# Patient Record
Sex: FEMALE | Race: WHITE
[De-identification: names, ages, dates, MRNs, and addresses within clinical notes are randomized per-mention and may not be internally consistent; named-entity substitution may affect disease eponyms.]

---

## 2018-01-24 ENCOUNTER — HOSPITAL ENCOUNTER (OUTPATIENT)
Dept: HOSPITAL 45 - C.MRIBC | Age: 53
Discharge: HOME | End: 2018-01-24
Attending: ORTHOPAEDIC SURGERY
Payer: COMMERCIAL
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
## 2018-01-24 DIAGNOSIS — S46.912A: Primary | ICD-10-CM

## 2018-01-24 NOTE — DIAGNOSTIC IMAGING REPORT
LEFT SHOULDER MRI



HISTORY:      Left shoulder pain.



TECHNIQUE: Multiplanar multisequence MRI of the left shoulder was performed

without contrast.



COMPARISON STUDY:  None.



FINDINGS: 



AC joint: There is moderate AC joint arthrosis demonstrated by subchondral

edema/cystic change and mild surrounding soft tissue edema.



Rotator cuff: The subscapularis and teres minor tendons are intact. Small

partial bursal surface tear seen within the distal infraspinatus tendon. There

is also a focal full-thickness tear of the supraspinatus tendon which measures

approximate 17 x 13 mm. There is edema at the musculotendinous junction of the

supraspinatus muscle. Trace fluid within the subacromial/subdeltoid bursa. Mild

fatty atrophy of the supraspinatus muscle.



Labrum: Abnormal signal configuration the superior labrum consistent with a SLAP

tear.



Biceps tendon: Increased signal within the proximal long head of the biceps

tendon consistent with a mild tendinopathy.



Bones: No fracture or dislocation.



Cartilage: Mild cartilage thinning within the humeral head and glenoid.



Miscellaneous: Punctate hypointense focus adjacent to the musculotendinous

junction of the supraspinatus which may be due to old postoperative change.



IMPRESSION:  



1. Focal 17 x 13 mm full-thickness tear of the supraspinatus tendon without

significant retraction.

2. There is also small partial tear at the bursal surface of the distal

infraspinatus tendon. 

3. SLAP tear.







Electronically signed by:  Sundeep Bass M.D.

1/24/2018 12:32 PM



Dictated Date/Time:  1/24/2018 12:25 PM

## 2018-03-01 ENCOUNTER — HOSPITAL ENCOUNTER (OUTPATIENT)
Dept: HOSPITAL 45 - X.SURG | Age: 53
Discharge: HOME | End: 2018-03-01
Attending: ORTHOPAEDIC SURGERY
Payer: COMMERCIAL

## 2018-03-01 VITALS — HEART RATE: 78 BPM | DIASTOLIC BLOOD PRESSURE: 65 MMHG | OXYGEN SATURATION: 99 % | SYSTOLIC BLOOD PRESSURE: 98 MMHG

## 2018-03-01 VITALS — TEMPERATURE: 97.88 F

## 2018-03-01 VITALS
WEIGHT: 148.3 LBS | BODY MASS INDEX: 25.32 KG/M2 | WEIGHT: 148.3 LBS | HEIGHT: 64.02 IN | HEIGHT: 64.02 IN | BODY MASS INDEX: 25.32 KG/M2

## 2018-03-01 DIAGNOSIS — Z82.3: ICD-10-CM

## 2018-03-01 DIAGNOSIS — Z90.49: ICD-10-CM

## 2018-03-01 DIAGNOSIS — M75.102: Primary | ICD-10-CM

## 2018-03-01 DIAGNOSIS — Z80.42: ICD-10-CM

## 2018-03-01 DIAGNOSIS — Z90.710: ICD-10-CM

## 2018-03-01 DIAGNOSIS — Z83.3: ICD-10-CM

## 2018-03-01 NOTE — [facility] POST OPERATIVE BRIEF NOTE
Immediate Operative Summary


Operative Date


Mar 1, 2018.





Pre-Operative Diagnosis





Chronic pain right upper limb, full thickness tear





Post-Operative Diagnosis





same as preop





Procedure(s) Performed





Left Shoulder Arthroscopic Medium Rotator cuff Repair with Extensive Debridement





Surgeon


Dr. Subramanian





Assistant Surgeon(s)


SHAHBAZ Ivy





Estimated Blood Loss


5ML





Findings


Consistent with Post-Op Diagnosis





Specimens





none





Anesthesia Type


[facility]





Complication(s)


none





Disposition


Disposition:  Recovery Room / PACU

## 2018-03-01 NOTE — OPERATIVE REPORT
DATE OF OPERATION:  03/01/2018

 

PREOPERATIVE DIAGNOSIS:  Medium sized rotator cuff tear of the left shoulder.

 

POSTOPERATIVE DIAGNOSIS:  Same.

 

PROCEDURE:  Left shoulder diagnostic arthroscopy with extensive debridement

and medium sized rotator cuff repair.

 

SURGEON:  Dr. Andrea Subramanian.

 

ASSISTANT:  Troy Real PA-C, whose assistance was necessary for positioning

the arm and helping with instrumentation.

 

ANESTHESIA:  Sedation with a left interscalene nerve block.

 

COMPLICATIONS:  None.

 

CONDITION:  Stable to PACU.

 

INDICATIONS:  Char is a pleasant 52-year-old female who had a left shoulder

arthroscopy done 18 months ago in [place].  She had a decompression and

distal clavicle resection at that time.  Unfortunately, she never did well

postoperatively and followup MRI in my office did show a medium size rotator

cuff tear.  She elected to undergo arthroscopy.

 

DESCRIPTION OF PROCEDURE:  On 03/01/2018, she arrived at [facility] for the above procedure.  She was seen in preoperative

holding and the operative extremity was identified and signed.  She was given

a preoperative antibiotic and a left interscalene nerve block.  She was taken

back to the operating room, laid on the table in supine position and put

under basic sedation.  She was then put into the beach chair position.  The

left shoulder was prepped and draped in sterile fashion.  Time-out was done. 

The patient's operative extremity was properly identified.

 

A scope was introduced into the posterior portal.  Diagnostic arthroscopy

showed no cartilage damage to the glenoid.  There was a large area of grade 4

chondral changes off the posterior superior aspect of the humeral head.  It

did not engage with the glenoid with the arm in neutral, but as soon as I

went about 20 degrees of external rotation, it was fully engaged.  There was

some fraying of the anterior labrum.  The biceps tendon was absent from

previous surgery.  There was a tear of the supraspinatus.  The anterior cable

was still intact.  The subscapularis was intact.  An anterior portal was

made.  A shaver was used to start a debridement of some of the

intra-articular structures.  The scope was then put into the subacromial

space.  A lateral portal was made.  A shaver was used to do a complete

subacromial and subdeltoid bursectomy.  Significant time was spent debriding

back tissue that was adhered from the previous surgery.  There was a lot of

scar tissue that went from the rotator cuff up to the undersurface of the

acromion and at all needed to be debrided and removed.  Attention was then

turned to the rotator cuff.  There was a medium size crescent-shaped rotator

cuff tear.  An additional anterolateral portal was made and Tami cannulas

were placed in each lateral portal.  The greater tuberosity was then prepared

with a ring curette and microfracture.  The rotator cuff was then fixed with

an Arthrex SpeedBridge configuration using BioComposite SwiveLock suture

anchors and FiberTapes.  Multiple pictures were taken.  The scope was placed

back into the glenohumeral joint and the articular margin of the rotator cuff

had been restored.  Pictures were taken.  Arthroscopic instruments were

removed from the shoulder.  Portal sites were closed with 3-0 nylon.  She was

then placed in a soft dressing and abduction arm sling.  She was then

extubated, transferred to a litter and taken to the postanesthesia care unit

in stable condition.  She tolerated the procedure well.

 

 

I attest to the content of the Intraoperative Record and any orders documented therein. Any exception
s are noted below.

## 2018-03-01 NOTE — DISCHARGE INSTRUCTIONS-[facility]
Discharge Instructions


Date of Service


Mar 1, 2018.





Visit


Reason for Visit:  Chronic Pain Right Upper Limb, Full Thickness Tear





Discharge


Discharge Diagnosis / Problem:  SAME AS ABOVE





Discharge Goals


Goal(s):  Decrease discomfort, Improve function





Activity Recommendations


Activity Limitations:  as noted below


Lifting Limitations:  until after follow-up appointment


Shower/Bathe:  tomorrow





Anesthesia


.





Post Anesthesia Instructions:





If you have had General Anesthesia or IV Sedation:





*  Do not drive today.


*  Resume driving when surgeon permits.


*  Do not make important decisions or sign legal documents today.


*  Call surgeon for:





   1.  Temperature elevations greater than 101 degrees F.


   2.  Uncontrollable pain.


   3.  Excessive bleeding.


   4.  Persistent nausea and vomiting.


   5.  Medication intolerance (nausea, vomiting or rash).





*  For nausea and vomiting use only clear liquids such as: tea, soda, bouillon 

until nausea subsides, then gradually increase diet as tolerated.





*  If you have any concerns or questions, call your surgeon's office.  If 

physician is unavailable and it is an emergency, call 911 or go to the nearest 

emergency room.





.





Instructions / Follow-Up


Instructions / Follow-Up





MEDICATIONS:





*  Resume previous medications unless instructed otherwise by your surgeon.





*  Always take pain medication on a full stomach or with food to avoid upset 

stomach. 





*  Do not drink alcohol or drive while taking narcotics.





*  Ibuprofen or Tylenol may be taken if narcotic not needed.








SPECIAL CARE INSTRUCTIONS:





__ None





_X_ Keep extremity elevated and iced x 48 hours; apply ice 20-30 


    minutes 8-10 times/day. May remove at night.





__ Sling


    __24 hrs/day


    __ Remove at night





_X_ Shoulder Immobilizer (MAY REMOVE AFTER 48 HOURS ONLY TO SHOWER AND FOR 

THERAPY) 


    _X_ 24 hrs/day


    __ Remove at night





_X_ Dressing


    __ Maintain until seen in office, may shower with plastic over site


    _X_ Remove dressings in 24-48 hours and then may shower


    _X_ Cover incisions with band-aids after showering


    __ Do not remove steri-strips





Call physician if chills or temperature rises above 102 degrees or pain


unrelieved by prescribed pain medications at [phone].


.





Diet Recommendations


Home Diet:  no limitations


Fluid Restriction:  None





Procedures


Procedures Performed:  


Left Shoulder Arthroscopic Medium Rotator cuff Repair with Extensive Debridement





Pending Studies


Studies pending at discharge:  no





Work Instructions


Return To Work:  after follow-up


Lifting Limitations:  NO LIFTING WITH RIGHT ARM





Medical Emergencies








.


Who to Call and When:





Medical Emergencies:  If at any time you feel your situation is an emergency, 

please call 911 immediately.





.





Non-Emergent Contact


Non-Emergency issues call your:  Primary Care Provider


Call Non-Emergent contact if:  you have a fever, temperature is above 101.5





.


.








"Provider Documentation" section prepared by Ernie Real.








.

## 2018-03-01 NOTE — ANESTHESIA PROGRESS NT - [facility]
Anesthesia Post Op Note


Date & Time


Mar 1, 2018 at 12:41





Vital Signs


Pain Intensity:  0





Vital Signs Past 12 Hours








  Date Time  Temp Pulse Resp B/P (MAP) Pulse Ox O2 Delivery O2 Flow Rate FiO2


 


3/1/18 12:06 36.6 84 16 99/61 (74) 99 Room Air  


 


3/1/18 10:36    102/71    


 


3/1/18 10:32  69      


 


3/1/18 10:32  70   99   


 


3/1/18 10:31    119/79    


 


3/1/18 10:27  75      


 


3/1/18 10:27  75   100   


 


3/1/18 10:26    106/76    


 


3/1/18 10:22  75      


 


3/1/18 10:22  73   99   


 


3/1/18 09:34 36.9 83 18 106/75 (85) 100 Room Air  











Notes


Mental Status:  alert / awake / arousable, participated in evaluation


Pt Amnestic to Procedure:  Yes


Nausea / Vomiting:  adequately controlled


Pain:  adequately controlled


Airway Patency, RR, SpO2:  stable & adequate


BP & HR:  stable & adequate


Hydration State:  stable & adequate


Anesthetic Complications:  no major complications apparent


Block working well in pacu